# Patient Record
Sex: MALE | Race: BLACK OR AFRICAN AMERICAN | NOT HISPANIC OR LATINO | Employment: FULL TIME | URBAN - METROPOLITAN AREA
[De-identification: names, ages, dates, MRNs, and addresses within clinical notes are randomized per-mention and may not be internally consistent; named-entity substitution may affect disease eponyms.]

---

## 2020-10-09 ENCOUNTER — NURSE TRIAGE (OUTPATIENT)
Dept: ADMINISTRATIVE | Facility: CLINIC | Age: 25
End: 2020-10-09

## 2020-10-09 NOTE — TELEPHONE ENCOUNTER
Pt reports symptoms x 3 months, has been tested over the past 3 months for covid with negative results. Pt reports runny nose, with clear drainage and pain to upper back and shoulder area. Denies cough or SOB. Pt endorses redness below both eyes and cheeks. Advised per protocol, has no PCP, recommended UC. VU.     Reason for Disposition   Redness or swelling on the cheek, forehead, or around the eye    Additional Information   Negative: Sounds like a life-threatening emergency to the triager   Negative: Difficulty breathing, and not from stuffy nose (e.g., not relieved by cleaning out the nose)   Negative: SEVERE headache and has fever   Negative: Patient sounds very sick or weak to the triager   Negative: SEVERE sinus pain   Negative: Severe headache    Protocols used: SINUS PAIN AND CONGESTION-A-OH

## 2023-11-03 ENCOUNTER — HOSPITAL ENCOUNTER (EMERGENCY)
Facility: HOSPITAL | Age: 28
Discharge: HOME | End: 2023-11-03
Attending: EMERGENCY MEDICINE

## 2023-11-03 VITALS
TEMPERATURE: 98 F | HEIGHT: 68 IN | WEIGHT: 220 LBS | HEART RATE: 70 BPM | OXYGEN SATURATION: 98 % | DIASTOLIC BLOOD PRESSURE: 70 MMHG | SYSTOLIC BLOOD PRESSURE: 140 MMHG | BODY MASS INDEX: 33.34 KG/M2 | RESPIRATION RATE: 16 BRPM

## 2023-11-03 DIAGNOSIS — S06.0X0A CONCUSSION WITHOUT LOSS OF CONSCIOUSNESS, INITIAL ENCOUNTER: ICD-10-CM

## 2023-11-03 DIAGNOSIS — S09.90XA INJURY OF HEAD, INITIAL ENCOUNTER: Primary | ICD-10-CM

## 2023-11-03 PROCEDURE — 63600175 PHARM REV CODE 636 W HCPCS: Performed by: PHYSICIAN ASSISTANT

## 2023-11-03 PROCEDURE — 96374 THER/PROPH/DIAG INJ IV PUSH: CPT

## 2023-11-03 PROCEDURE — 96361 HYDRATE IV INFUSION ADD-ON: CPT

## 2023-11-03 PROCEDURE — 99285 EMERGENCY DEPT VISIT HI MDM: CPT | Mod: 25

## 2023-11-03 PROCEDURE — 25000003 PHARM REV CODE 250: Performed by: PHYSICIAN ASSISTANT

## 2023-11-03 RX ORDER — ONDANSETRON 4 MG/1
4 TABLET, ORALLY DISINTEGRATING ORAL
Status: COMPLETED | OUTPATIENT
Start: 2023-11-03 | End: 2023-11-03

## 2023-11-03 RX ORDER — ONDANSETRON 4 MG/1
4 TABLET, ORALLY DISINTEGRATING ORAL 2 TIMES DAILY
Qty: 4 TABLET | Refills: 0 | Status: SHIPPED | OUTPATIENT
Start: 2023-11-03 | End: 2023-11-05

## 2023-11-03 RX ORDER — KETOROLAC TROMETHAMINE 30 MG/ML
10 INJECTION, SOLUTION INTRAMUSCULAR; INTRAVENOUS
Status: COMPLETED | OUTPATIENT
Start: 2023-11-03 | End: 2023-11-03

## 2023-11-03 RX ORDER — ACETAMINOPHEN 500 MG
1000 TABLET ORAL
Status: COMPLETED | OUTPATIENT
Start: 2023-11-03 | End: 2023-11-03

## 2023-11-03 RX ADMIN — KETOROLAC TROMETHAMINE 10 MG: 30 INJECTION, SOLUTION INTRAMUSCULAR; INTRAVENOUS at 03:11

## 2023-11-03 RX ADMIN — ONDANSETRON 4 MG: 4 TABLET, ORALLY DISINTEGRATING ORAL at 02:11

## 2023-11-03 RX ADMIN — ACETAMINOPHEN 1000 MG: 500 TABLET ORAL at 02:11

## 2023-11-03 RX ADMIN — SODIUM CHLORIDE, POTASSIUM CHLORIDE, SODIUM LACTATE AND CALCIUM CHLORIDE 1000 ML: 600; 310; 30; 20 INJECTION, SOLUTION INTRAVENOUS at 03:11

## 2023-11-03 NOTE — ED TRIAGE NOTES
At work between 11am and 1pm  pt was struck in head by a metal door that came down on his head,  denies LOC.C/O neck  pain . Feet and hands feel numb. Feels nauseated. Now having blurred vision. Attempting to read eye chart and started crying  saying he can't read it . Walked into exam room unassisted.

## 2023-11-03 NOTE — ED NOTES
LOC: The patient is awake and alert; oriented x 3 and speaking appropriately.  APPEARANCE: Patient resting comfortably, patient is clean and well groomed  SKIN: warm and dry, normal skin turgor & moist mucus membranes, skin intact, no breakdown noted.  MUSCULOSKELETAL: Patient moving all extremities well, no obvious swelling or deformities noted, c/o numbness in hands and feet and c/o neck pain   RESPIRATORY: Airway is open and patent, respirations are spontaneous, normal effort and rate  CARDIAC: Patient has a normal rate, no peripheral edema noted, capillary refill < 3 seconds; No complaints of chest pain   ABDOMEN: Soft and non tender to palpation, no distention noted.

## 2023-11-03 NOTE — ED PROVIDER NOTES
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  States he was at work and he was closing a large beta or which came off the hinges and swung and hit him in the back of the head.  Denies any LOC but complains of pain to the posterior scalp, neck and feels as if he is going to pass out.  Reports mild nausea no vomiting and states his vision is blurred in both eyes.  He is not on any blood thinners.        Review of patient's allergies indicates:  No Known Allergies  History reviewed. No pertinent past medical history.  History reviewed. No pertinent surgical history.  History reviewed. No pertinent family history.  Social History     Tobacco Use    Smoking status: Every Day     Types: Cigarettes    Smokeless tobacco: Never   Substance Use Topics    Alcohol use: Yes    Drug use: Never     Review of Systems   Constitutional:  Negative for chills and fever.   HENT:  Negative for sore throat.    Eyes:  Positive for visual disturbance.   Respiratory:  Negative for cough and shortness of breath.    Cardiovascular:  Negative for chest pain.   Gastrointestinal:  Negative for abdominal pain.   Genitourinary:  Negative for difficulty urinating.   Musculoskeletal: Negative.    Neurological:  Positive for dizziness and headaches. Negative for weakness.   Psychiatric/Behavioral:  Negative for confusion.        Physical Exam     Initial Vitals [11/03/23 1343]   BP Pulse Resp Temp SpO2   (!) 149/85 79 18 98.1 °F (36.7 °C) 98 %      MAP       --         Physical Exam    Nursing note and vitals reviewed.  Constitutional: He appears well-developed and well-nourished.   HENT:   Head: Normocephalic and atraumatic.   No hematoma, negative bee sign, no evidence basilar skull fracture.   Eyes: Conjunctivae and EOM are normal. Pupils are equal,  round, and reactive to light.   Neck: Neck supple.   Mild tenderness to the midline C-spine at C3.   Normal range of motion.  Cardiovascular:  Normal rate.           Pulmonary/Chest: Breath sounds normal. No respiratory distress.   Abdominal: Abdomen is soft. There is no abdominal tenderness.   Musculoskeletal:         General: Normal range of motion.      Cervical back: Normal range of motion and neck supple.     Neurological: He is alert and oriented to person, place, and time. He has normal strength. GCS score is 15. GCS eye subscore is 4. GCS verbal subscore is 5. GCS motor subscore is 6.   Facial features symmetric, no dysarthria  strength equal, negative pronator drift, normal finger-to-nose exam, normal gait   Skin: Skin is warm and dry. Capillary refill takes less than 2 seconds.         ED Course   Procedures  Labs Reviewed - No data to display         Imaging Results              CT Cervical Spine Without Contrast (Final result)  Result time 11/03/23 14:50:40      Final result by Patrick Foster MD (11/03/23 14:50:40)                   Impression:      No evidence of acute intracranial hemorrhage.    No displaced fracture or traumatic malalignment in the cervical spine.      Electronically signed by: Patrick Foster MD  Date:    11/03/2023  Time:    14:50               Narrative:    EXAMINATION:  CT HEAD WITHOUT CONTRAST; CT CERVICAL SPINE WITHOUT CONTRAST    CLINICAL HISTORY:  Head trauma, moderate-severe;; Neck trauma, midline tenderness (Age 16-64y);    TECHNIQUE:  Low dose axial CT images obtained throughout the head and cervical spine without intravenous contrast.  Axial, sagittal and coronal reconstructions were performed.    COMPARISON:  None.    FINDINGS:  Head:    Ventricles and sulci are normal in size for age without evidence of hydrocephalus.    The brain parenchyma appears within normal limits.  No parenchymal mass, hemorrhage, edema or major vascular distribution infarct.    No  extra-axial blood or fluid collections.    No displaced calvarial fracture.    Mastoid air cells and paranasal sinuses are essentially clear.    Spine:    The vertebral bodies are normal in height and morphology without evidence of fracture.    Normal sagittal alignment.  No spondylolisthesis.    No significant degenerative change without evidence of bony spinal canal stenosis or high-grade neural foraminal narrowing.    Limited evaluation of the intraspinal contents demonstrates no evidence of hematoma.    The paraspinal soft tissue structures exhibit no acute abnormalities.                                       CT Head Without Contrast (Final result)  Result time 11/03/23 14:50:40      Final result by Patrick Foster MD (11/03/23 14:50:40)                   Impression:      No evidence of acute intracranial hemorrhage.    No displaced fracture or traumatic malalignment in the cervical spine.      Electronically signed by: Patrick Foster MD  Date:    11/03/2023  Time:    14:50               Narrative:    EXAMINATION:  CT HEAD WITHOUT CONTRAST; CT CERVICAL SPINE WITHOUT CONTRAST    CLINICAL HISTORY:  Head trauma, moderate-severe;; Neck trauma, midline tenderness (Age 16-64y);    TECHNIQUE:  Low dose axial CT images obtained throughout the head and cervical spine without intravenous contrast.  Axial, sagittal and coronal reconstructions were performed.    COMPARISON:  None.    FINDINGS:  Head:    Ventricles and sulci are normal in size for age without evidence of hydrocephalus.    The brain parenchyma appears within normal limits.  No parenchymal mass, hemorrhage, edema or major vascular distribution infarct.    No extra-axial blood or fluid collections.    No displaced calvarial fracture.    Mastoid air cells and paranasal sinuses are essentially clear.    Spine:    The vertebral bodies are normal in height and morphology without evidence of fracture.    Normal sagittal alignment.  No spondylolisthesis.    No  significant degenerative change without evidence of bony spinal canal stenosis or high-grade neural foraminal narrowing.    Limited evaluation of the intraspinal contents demonstrates no evidence of hematoma.    The paraspinal soft tissue structures exhibit no acute abnormalities.                                       Medications   acetaminophen tablet 1,000 mg (1,000 mg Oral Given 11/3/23 1430)   ondansetron disintegrating tablet 4 mg (4 mg Oral Given 11/3/23 1430)   lactated ringers bolus 1,000 mL (0 mLs Intravenous Stopped 11/3/23 1642)   ketorolac injection 9.999 mg (9.999 mg Intravenous Given 11/3/23 1537)     Medical Decision Making  27-year-old male presents ED after getting hit in the back of the head with a large garage door work.  Denies any LOC but complains of blurred vision and dizziness.  No focal neurological deficits on exam.  CT head and C-spine were obtained which did not show any evidence of acute fractures or intracranial hemorrhage.  Patient is tolerating p.o. after Zofran.  Does have significant dizziness which I suspect is from a mild concussion.  Patient has a ride home and discussed cognitive rest and follow-up with concussion clinic or PCP.  Discussed return ED precautions for any new or worsening symptoms.    Amount and/or Complexity of Data Reviewed  Radiology: ordered.    Risk  OTC drugs.  Prescription drug management.              Attending Attestation:             Attending ED Notes:   The face-to-face encounter and management were performed solely by the [facility].  I was immediately available for consultation, but was not involved in the care of the patient.                        Clinical Impression:   Final diagnoses:  [S09.90XA] Injury of head, initial encounter (Primary)  [S06.0X0A] Concussion without loss of consciousness, initial encounter        ED Disposition Condition    Discharge Stable          ED Prescriptions       Medication Sig Dispense Start Date End Date Auth. Provider     ondansetron (ZOFRAN-ODT) 4 MG TbDL Take 1 tablet (4 mg total) by mouth 2 (two) times daily. for 2 days 4 tablet 11/3/2023 11/5/2023 Cecille Dillon PA-C          Follow-up Information       Follow up With Specialties Details Why Contact Info    [facility] -  Schedule an appointment as soon as possible for a visit   [address] [facility] [address]  [phone]               Cecille Dillon PA-C  11/03/23 0330       Zach Riojas MD  11/04/23 0188

## 2023-11-07 ENCOUNTER — TELEPHONE (OUTPATIENT)
Dept: NEUROLOGY | Facility: CLINIC | Age: 28
End: 2023-11-07

## 2023-11-07 ENCOUNTER — HOSPITAL ENCOUNTER (EMERGENCY)
Facility: HOSPITAL | Age: 28
Discharge: HOME | End: 2023-11-07
Attending: EMERGENCY MEDICINE

## 2023-11-07 VITALS
WEIGHT: 220 LBS | SYSTOLIC BLOOD PRESSURE: 145 MMHG | HEART RATE: 87 BPM | RESPIRATION RATE: 18 BRPM | DIASTOLIC BLOOD PRESSURE: 93 MMHG | BODY MASS INDEX: 33.45 KG/M2 | TEMPERATURE: 98 F | OXYGEN SATURATION: 99 %

## 2023-11-07 DIAGNOSIS — S16.1XXS NECK STRAIN, SEQUELA: Primary | ICD-10-CM

## 2023-11-07 DIAGNOSIS — M62.838 NECK MUSCLE SPASM: ICD-10-CM

## 2023-11-07 DIAGNOSIS — S09.90XD CLOSED HEAD INJURY, SUBSEQUENT ENCOUNTER: ICD-10-CM

## 2023-11-07 PROCEDURE — 99284 EMERGENCY DEPT VISIT MOD MDM: CPT

## 2023-11-07 PROCEDURE — 25000003 PHARM REV CODE 250: Performed by: EMERGENCY MEDICINE

## 2023-11-07 RX ORDER — NAPROXEN 375 MG/1
375 TABLET ORAL 2 TIMES DAILY WITH MEALS
Qty: 10 TABLET | Refills: 0 | Status: SHIPPED | OUTPATIENT
Start: 2023-11-07 | End: 2023-11-12

## 2023-11-07 RX ORDER — NAPROXEN 500 MG/1
500 TABLET ORAL
Status: COMPLETED | OUTPATIENT
Start: 2023-11-07 | End: 2023-11-07

## 2023-11-07 RX ORDER — LIDOCAINE 50 MG/G
1 PATCH TOPICAL
Status: DISCONTINUED | OUTPATIENT
Start: 2023-11-07 | End: 2023-11-07 | Stop reason: HOSPADM

## 2023-11-07 RX ORDER — METHOCARBAMOL 500 MG/1
1000 TABLET, FILM COATED ORAL 3 TIMES DAILY
Qty: 30 TABLET | Refills: 0 | Status: SHIPPED | OUTPATIENT
Start: 2023-11-07 | End: 2023-11-12

## 2023-11-07 RX ORDER — METHOCARBAMOL 750 MG/1
1500 TABLET, FILM COATED ORAL
Status: COMPLETED | OUTPATIENT
Start: 2023-11-07 | End: 2023-11-07

## 2023-11-07 RX ADMIN — LIDOCAINE 1 PATCH: 50 PATCH TOPICAL at 10:11

## 2023-11-07 RX ADMIN — METHOCARBAMOL 1500 MG: 750 TABLET ORAL at 10:11

## 2023-11-07 RX ADMIN — NAPROXEN 500 MG: 500 TABLET ORAL at 10:11

## 2023-11-07 NOTE — Clinical Note
"Rajiv Campo" Robert was seen and treated in our emergency department on 11/7/2023.  He may return to work on 11/13/2023.       If you have any questions or concerns, please don't hesitate to call.      MD Concepcion    "

## 2023-11-07 NOTE — TELEPHONE ENCOUNTER
Called Pt to discuss his concussion he had recently. I needed to clarify if this will be workers comp related. I was unable to speak with him but left a vm.

## 2023-11-07 NOTE — ED NOTES
"Patient states in ED for " follow up" after door hit him on the head, reports he was toldhe had a concussion. Reports still feeling lightheaded, pain neck and head, trouble sleeping,   "

## 2023-11-07 NOTE — ED NOTES
Patient identifiers verified and correct for Mr Jones  C/C:  Headache SEE NN  APPEARANCE: awake and alert in NAD. PAIN  8/10  SKIN: warm, dry and intact. No breakdown or bruising.  MUSCULOSKELETAL: Patient moving all extremities spontaneously, no obvious swelling or deformities noted. Ambulates independently.  RESPIRATORY: Denies shortness of breath.Respirations unlabored.   CARDIAC: Denies CP, 2+ distal pulses; no peripheral edema  ABDOMEN: S/ND/NT, Denies nausea  : voids spontaneously, denies difficulty  Neurologic: AAO x 4; follows commands equal strength in all extremities; denies numbness/tingling. Denies dizziness Denies new wekaness,, reports back of head pain

## 2023-11-07 NOTE — ED PROVIDER NOTES
Encounter Date: 11/7/2023       History     Chief Complaint   Patient presents with    Headache     States had a concussion 3 days ago still having headache and nausea      HPI  28 Y/O healthy, non-smoker M C/O of non-radiating, occipital HA with associated neck tightness. He reports HA was gradual in onset 3 days ago and 1 day S/P closed head injury 4 days ago that led to ED visit for evaluation. He denies any numbness or weakness to upper or lower extremities. No urinary retention, urinary strength or urinary stream caliper changes. No reported perianal sensory deficits upon post-defecation wiping; no Hx of IVDU or perispinal surgeries; no Hx of trauma or bleeding disorders. He reports pain is aggravated with movement  and alleviated with rest. No ROM deficits to B/L UE or LE and ambulating at baseline. Otherwise, no recent illness or other complaints.           Review of patient's allergies indicates:  No Known Allergies  History reviewed. No pertinent past medical history.  History reviewed. No pertinent surgical history.  History reviewed. No pertinent family history.  Social History     Tobacco Use    Smoking status: Every Day     Types: Cigarettes    Smokeless tobacco: Never   Substance Use Topics    Alcohol use: Yes    Drug use: Never     Review of Systems    Physical Exam     Initial Vitals [11/07/23 0912]   BP Pulse Resp Temp SpO2   (!) 145/93 87 18 98 °F (36.7 °C) 99 %      MAP       --         Physical Exam  GENERAL: Well-appearing and Non-Toxic; Well-Nourished; NAD.  HEENT: AT/NC; PERRL, EOMI, Acuity & Fields WNL; TA region with no TTP, no nodularity or pulse asymmetry to TA; speaking full sentences with no drooling.  NECK: Supple, FROM with no meningismus, no accessory muscle use. No carotid bruits B/L.  HEART: Regular rate and rhythm, no M/G/T.   LUNGS: No Tachypnea, No Retractions, and CTA B/L with no W/R/R.  ABDOMEN: Soft, ND, NTTP. No rigidity. No guarding.   BACK: Atraumatic, No midline TTP to  C/T/LS spine with + TTP over bilateral trap muscle and cervical paravertebral muscle eliciting/reproducing described pain; No CVA tenderness B/L.   EXTREMITIES: FROM.   SKIN: Warm, Dry, No Skin Tears or Rashes.  VASCULAR: 2+ pulses Prox/Dist &amp; Symm with no delay.  NEUROLOGIC: AAOx3, No Receptive or Expressive Aphasia, Answering Questions Appropriately, Recent & Remote Memory Intact, No Visual or Tactile Agnosia to B/L UE; CN/PN Intact, Strength 5/5, Sens Symmetrical to UE & LE, No Ataxia, NEG Romberg's and WNL FTN & HTS. Intact Marching in place.        ED Course   Procedures  Labs Reviewed - No data to display       Imaging Results    None          Medications   LIDOcaine 5 % patch 1 patch (1 patch Transdermal Patch Applied 11/7/23 1058)   methocarbamoL tablet 1,500 mg (1,500 mg Oral Given 11/7/23 1057)   naproxen tablet 500 mg (500 mg Oral Given 11/7/23 1056)     Medical Decision Making  Well-appearing neurovascularly intact and hemodynamically stable male presenting with signs and symptoms of muscle strain status post closed head injury 4 days ago.  No red flags warranting additional imaging at this time.  Discussed symptoms warranting ED return and provided him with NSAIDs and muscle relaxants for his muscle strain pain.  ____________________  Igor Jimenez MD, [facility]  Emergency Medicine Staff  10:59 AM 11/7/2023      Risk  Prescription drug management.                               Clinical Impression:   Final diagnoses:  [S16.1XXS] Neck strain, sequela (Primary)  [M62.838] Neck muscle spasm  [S09.90XD] Closed head injury, subsequent encounter        ED Disposition Condition    Discharge Stable          ED Prescriptions       Medication Sig Dispense Start Date End Date Auth. Provider    methocarbamoL (ROBAXIN) 500 MG Tab Take 2 tablets (1,000 mg total) by mouth 3 (three) times daily. for 5 days 30 tablet 11/7/2023 11/12/2023 Ralph Jimenez MD    naproxen (NAPROSYN) 375 MG tablet Take 1 tablet (375 mg  total) by mouth 2 (two) times daily with meals. for 5 days 10 tablet 11/7/2023 11/12/2023 Ralph Jimenez MD          Follow-up Information       Follow up With Specialties Details Why Contact Info    [facility] Emergency Medicine   [address] [place] [id number]  [phone]             Ralph Jimenez MD  11/07/23 3207

## 2023-11-07 NOTE — Clinical Note
"Rajiv Jones (Daven) was seen and treated in our emergency department on 11/7/2023.  He may return to work on 11/13/2023.       If you have any questions or concerns, please don't hesitate to call.      LING Javier    "

## 2023-11-07 NOTE — Clinical Note
"Rajiv Jones (Daven) was seen and treated in our emergency department on 11/7/2023.  He may return to work on 11/09/2023.       If you have any questions or concerns, please don't hesitate to call.      LING Javier    "

## 2023-11-07 NOTE — Clinical Note
"Rajiv Campo" Robert was seen and treated in our emergency department on 11/7/2023.  He may return to work on 11/09/2023.       If you have any questions or concerns, please don't hesitate to call.      MD Concepcion    "

## 2024-05-02 ENCOUNTER — OCCUPATIONAL HEALTH (OUTPATIENT)
Dept: URGENT CARE | Facility: CLINIC | Age: 29
End: 2024-05-02

## 2024-05-02 DIAGNOSIS — Z02.89 ENCOUNTER FOR EXAMINATION REQUIRED BY DEPARTMENT OF TRANSPORTATION (DOT): Primary | ICD-10-CM

## 2024-05-02 PROCEDURE — 99499 UNLISTED E&M SERVICE: CPT | Mod: S$GLB,,, | Performed by: SURGERY
